# Patient Record
Sex: FEMALE | Race: WHITE | NOT HISPANIC OR LATINO | Employment: UNEMPLOYED | URBAN - METROPOLITAN AREA
[De-identification: names, ages, dates, MRNs, and addresses within clinical notes are randomized per-mention and may not be internally consistent; named-entity substitution may affect disease eponyms.]

---
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
## 2025-01-07 ENCOUNTER — APPOINTMENT (OUTPATIENT)
Dept: PEDIATRICS | Facility: CLINIC | Age: 20
End: 2025-01-07
Payer: COMMERCIAL

## 2025-01-07 VITALS — HEART RATE: 84 BPM | TEMPERATURE: 97.9 F | WEIGHT: 129.5 LBS | BODY MASS INDEX: 22.23 KG/M2 | RESPIRATION RATE: 16 BRPM

## 2025-01-07 DIAGNOSIS — R19.7 DIARRHEA, UNSPECIFIED TYPE: ICD-10-CM

## 2025-01-07 DIAGNOSIS — K92.1 BLOOD IN STOOL: ICD-10-CM

## 2025-01-07 DIAGNOSIS — R10.9 STOMACH PAIN: ICD-10-CM

## 2025-01-07 DIAGNOSIS — R63.4 WEIGHT LOSS: Primary | ICD-10-CM

## 2025-01-07 PROCEDURE — 99214 OFFICE O/P EST MOD 30 MIN: CPT | Performed by: NURSE PRACTITIONER

## 2025-01-07 ASSESSMENT — ENCOUNTER SYMPTOMS
RESPIRATORY NEGATIVE: 1
APPETITE CHANGE: 1
DIARRHEA: 1
HEMATOLOGIC/LYMPHATIC NEGATIVE: 1
EYES NEGATIVE: 1
NAUSEA: 1
CONSTIPATION: 1
NEUROLOGICAL NEGATIVE: 1
VOMITING: 1
FEVER: 0
BLOOD IN STOOL: 1
CARDIOVASCULAR NEGATIVE: 1
ACTIVITY CHANGE: 1
ABDOMINAL PAIN: 1
PSYCHIATRIC NEGATIVE: 1

## 2025-01-07 NOTE — PROGRESS NOTES
Subjective   Patient ID: Sushila Jeong is a 19 y.o. female who presents for concerns about a tape worm.  Pt states at the beginning of 2024 she was 180# and now is 130  Trouble keeping food down  Vomiting  Bm changes consistency, sometimes soft, sometimes formed  Abdominal pain and burning  Saw what appeared to be a tape worm in her bm the other day, does have photo    Past year with intermittent stomach pain, vomiting, diarrhea. Sometimes blood in stool. Sometimes with fever. Not eating well, because cannot keep down. No urinary sx. Voids 2 times/day. Stomach feels like burning,not like when she has abscess. 5 days ago saw worm like thing in stool. Followed by GI, has colonoscopy end of month. Has lost at least 50lbs this past year.        Review of Systems   Constitutional:  Positive for activity change and appetite change. Negative for fever.   HENT: Negative.     Eyes: Negative.    Respiratory: Negative.     Cardiovascular: Negative.    Gastrointestinal:  Positive for abdominal pain, blood in stool, constipation, diarrhea, nausea and vomiting.   Neurological: Negative.    Hematological: Negative.    Psychiatric/Behavioral: Negative.         Objective   Physical Exam  Constitutional:       General: She is not in acute distress.     Appearance: She is normal weight.   HENT:      Nose: Nose normal.      Mouth/Throat:      Mouth: Mucous membranes are moist.      Pharynx: Oropharynx is clear.   Eyes:      Conjunctiva/sclera: Conjunctivae normal.   Cardiovascular:      Rate and Rhythm: Normal rate and regular rhythm.      Heart sounds: Normal heart sounds.   Pulmonary:      Effort: Pulmonary effort is normal.      Breath sounds: Normal breath sounds.   Abdominal:      General: Abdomen is flat. Bowel sounds are normal. There is no distension.      Palpations: Abdomen is soft. There is no mass.      Tenderness: There is abdominal tenderness. There is no right CVA tenderness, left CVA tenderness, guarding or  rebound.      Hernia: No hernia is present.   Musculoskeletal:      Cervical back: Neck supple.   Lymphadenopathy:      Cervical: No cervical adenopathy.   Skin:     Coloration: Skin is pale.   Neurological:      Mental Status: She is alert and oriented to person, place, and time.   Psychiatric:         Mood and Affect: Mood normal.         Behavior: Behavior normal.         Assessment/Plan     To get stool culture, O&P, reducing substances. Will check in with GI. Plan to follow up with them this month for colonoscopy. Follow up sooner with fever, poor fluid intake, dehydration, any worsening pain. Has zofran as needed for nausea/vomiting.       Amanda Deluca MA 01/07/25 4:03 PM

## 2025-01-08 ENCOUNTER — APPOINTMENT (OUTPATIENT)
Dept: GASTROENTEROLOGY | Facility: EXTERNAL LOCATION | Age: 20
End: 2025-01-08
Payer: COMMERCIAL

## 2025-01-08 PROCEDURE — 99285 EMERGENCY DEPT VISIT HI MDM: CPT | Performed by: EMERGENCY MEDICINE

## 2025-01-08 ASSESSMENT — COLUMBIA-SUICIDE SEVERITY RATING SCALE - C-SSRS
6. HAVE YOU EVER DONE ANYTHING, STARTED TO DO ANYTHING, OR PREPARED TO DO ANYTHING TO END YOUR LIFE?: NO
2. HAVE YOU ACTUALLY HAD ANY THOUGHTS OF KILLING YOURSELF?: NO
1. IN THE PAST MONTH, HAVE YOU WISHED YOU WERE DEAD OR WISHED YOU COULD GO TO SLEEP AND NOT WAKE UP?: NO

## 2025-01-08 ASSESSMENT — PAIN - FUNCTIONAL ASSESSMENT: PAIN_FUNCTIONAL_ASSESSMENT: 0-10

## 2025-01-08 ASSESSMENT — PAIN DESCRIPTION - LOCATION: LOCATION: ABDOMEN

## 2025-01-08 ASSESSMENT — PAIN SCALES - GENERAL: PAINLEVEL_OUTOF10: 7

## 2025-01-08 ASSESSMENT — PAIN DESCRIPTION - ORIENTATION: ORIENTATION: MID

## 2025-01-08 ASSESSMENT — PAIN DESCRIPTION - DESCRIPTORS: DESCRIPTORS: BURNING

## 2025-01-09 ENCOUNTER — APPOINTMENT (OUTPATIENT)
Dept: RADIOLOGY | Facility: HOSPITAL | Age: 20
End: 2025-01-09

## 2025-01-09 ENCOUNTER — HOSPITAL ENCOUNTER (EMERGENCY)
Facility: HOSPITAL | Age: 20
Discharge: HOME | End: 2025-01-09
Attending: EMERGENCY MEDICINE

## 2025-01-09 VITALS
HEART RATE: 79 BPM | RESPIRATION RATE: 16 BRPM | WEIGHT: 128 LBS | BODY MASS INDEX: 21.85 KG/M2 | HEIGHT: 64 IN | SYSTOLIC BLOOD PRESSURE: 115 MMHG | OXYGEN SATURATION: 97 % | TEMPERATURE: 98.4 F | DIASTOLIC BLOOD PRESSURE: 78 MMHG

## 2025-01-09 DIAGNOSIS — K59.00 CONSTIPATION, UNSPECIFIED CONSTIPATION TYPE: Primary | ICD-10-CM

## 2025-01-09 LAB
ALBUMIN SERPL BCP-MCNC: 4.2 G/DL (ref 3.4–5)
ALP SERPL-CCNC: 57 U/L (ref 33–110)
ALT SERPL W P-5'-P-CCNC: 5 U/L (ref 7–45)
ANION GAP SERPL CALC-SCNC: 12 MMOL/L (ref 10–20)
APPEARANCE UR: ABNORMAL
AST SERPL W P-5'-P-CCNC: 8 U/L (ref 9–39)
B-HCG SERPL-ACNC: <2 MIU/ML
BACTERIA #/AREA URNS AUTO: ABNORMAL /HPF
BASOPHILS # BLD AUTO: 0.03 X10*3/UL (ref 0–0.1)
BASOPHILS NFR BLD AUTO: 0.4 %
BILIRUB SERPL-MCNC: 1.3 MG/DL (ref 0–1.2)
BILIRUB UR STRIP.AUTO-MCNC: NEGATIVE MG/DL
BUN SERPL-MCNC: 10 MG/DL (ref 6–23)
CALCIUM SERPL-MCNC: 9.1 MG/DL (ref 8.6–10.3)
CHLORIDE SERPL-SCNC: 107 MMOL/L (ref 98–107)
CO2 SERPL-SCNC: 25 MMOL/L (ref 21–32)
COLOR UR: YELLOW
CREAT SERPL-MCNC: 0.52 MG/DL (ref 0.5–1.05)
EGFRCR SERPLBLD CKD-EPI 2021: >90 ML/MIN/1.73M*2
EOSINOPHIL # BLD AUTO: 0.14 X10*3/UL (ref 0–0.7)
EOSINOPHIL NFR BLD AUTO: 1.8 %
ERYTHROCYTE [DISTWIDTH] IN BLOOD BY AUTOMATED COUNT: 14.6 % (ref 11.5–14.5)
FLUAV RNA RESP QL NAA+PROBE: NOT DETECTED
FLUBV RNA RESP QL NAA+PROBE: NOT DETECTED
GLUCOSE SERPL-MCNC: 86 MG/DL (ref 74–99)
GLUCOSE UR STRIP.AUTO-MCNC: NORMAL MG/DL
HCT VFR BLD AUTO: 37.2 % (ref 36–46)
HGB BLD-MCNC: 12.1 G/DL (ref 12–16)
IMM GRANULOCYTES # BLD AUTO: 0.02 X10*3/UL (ref 0–0.7)
IMM GRANULOCYTES NFR BLD AUTO: 0.3 % (ref 0–0.9)
KETONES UR STRIP.AUTO-MCNC: NEGATIVE MG/DL
LEUKOCYTE ESTERASE UR QL STRIP.AUTO: ABNORMAL
LIPASE SERPL-CCNC: 25 U/L (ref 9–82)
LYMPHOCYTES # BLD AUTO: 2.15 X10*3/UL (ref 1.2–4.8)
LYMPHOCYTES NFR BLD AUTO: 27.1 %
MCH RBC QN AUTO: 25.6 PG (ref 26–34)
MCHC RBC AUTO-ENTMCNC: 32.5 G/DL (ref 32–36)
MCV RBC AUTO: 79 FL (ref 80–100)
MONOCYTES # BLD AUTO: 0.55 X10*3/UL (ref 0.1–1)
MONOCYTES NFR BLD AUTO: 6.9 %
MUCOUS THREADS #/AREA URNS AUTO: ABNORMAL /LPF
NEUTROPHILS # BLD AUTO: 5.03 X10*3/UL (ref 1.2–7.7)
NEUTROPHILS NFR BLD AUTO: 63.5 %
NITRITE UR QL STRIP.AUTO: NEGATIVE
NRBC BLD-RTO: 0 /100 WBCS (ref 0–0)
PH UR STRIP.AUTO: 7.5 [PH]
PLATELET # BLD AUTO: 266 X10*3/UL (ref 150–450)
POTASSIUM SERPL-SCNC: 4.2 MMOL/L (ref 3.5–5.3)
PROT SERPL-MCNC: 7.1 G/DL (ref 6.4–8.2)
PROT UR STRIP.AUTO-MCNC: ABNORMAL MG/DL
RBC # BLD AUTO: 4.73 X10*6/UL (ref 4–5.2)
RBC # UR STRIP.AUTO: NEGATIVE /UL
RBC #/AREA URNS AUTO: ABNORMAL /HPF
RSV RNA RESP QL NAA+PROBE: NOT DETECTED
S PYO DNA THROAT QL NAA+PROBE: NOT DETECTED
SARS-COV-2 RNA RESP QL NAA+PROBE: NOT DETECTED
SODIUM SERPL-SCNC: 140 MMOL/L (ref 136–145)
SP GR UR STRIP.AUTO: 1.03
SQUAMOUS #/AREA URNS AUTO: ABNORMAL /HPF
UROBILINOGEN UR STRIP.AUTO-MCNC: ABNORMAL MG/DL
WBC # BLD AUTO: 7.9 X10*3/UL (ref 4.4–11.3)
WBC #/AREA URNS AUTO: ABNORMAL /HPF

## 2025-01-09 PROCEDURE — 74018 RADEX ABDOMEN 1 VIEW: CPT

## 2025-01-09 PROCEDURE — 87086 URINE CULTURE/COLONY COUNT: CPT | Mod: AHULAB | Performed by: EMERGENCY MEDICINE

## 2025-01-09 PROCEDURE — 83690 ASSAY OF LIPASE: CPT | Performed by: EMERGENCY MEDICINE

## 2025-01-09 PROCEDURE — 76856 US EXAM PELVIC COMPLETE: CPT | Performed by: RADIOLOGY

## 2025-01-09 PROCEDURE — 81001 URINALYSIS AUTO W/SCOPE: CPT | Performed by: EMERGENCY MEDICINE

## 2025-01-09 PROCEDURE — 87637 SARSCOV2&INF A&B&RSV AMP PRB: CPT | Performed by: EMERGENCY MEDICINE

## 2025-01-09 PROCEDURE — 2500000004 HC RX 250 GENERAL PHARMACY W/ HCPCS (ALT 636 FOR OP/ED): Performed by: EMERGENCY MEDICINE

## 2025-01-09 PROCEDURE — 84702 CHORIONIC GONADOTROPIN TEST: CPT | Performed by: EMERGENCY MEDICINE

## 2025-01-09 PROCEDURE — 87651 STREP A DNA AMP PROBE: CPT | Performed by: EMERGENCY MEDICINE

## 2025-01-09 PROCEDURE — 36415 COLL VENOUS BLD VENIPUNCTURE: CPT | Performed by: EMERGENCY MEDICINE

## 2025-01-09 PROCEDURE — 80053 COMPREHEN METABOLIC PANEL: CPT | Performed by: EMERGENCY MEDICINE

## 2025-01-09 PROCEDURE — 74018 RADEX ABDOMEN 1 VIEW: CPT | Performed by: SURGERY

## 2025-01-09 PROCEDURE — 76856 US EXAM PELVIC COMPLETE: CPT

## 2025-01-09 PROCEDURE — 85025 COMPLETE CBC W/AUTO DIFF WBC: CPT | Performed by: EMERGENCY MEDICINE

## 2025-01-09 RX ORDER — POLYETHYLENE GLYCOL 3350 17 G/17G
17 POWDER, FOR SOLUTION ORAL DAILY
Qty: 3 PACKET | Refills: 0 | Status: SHIPPED | OUTPATIENT
Start: 2025-01-09 | End: 2025-01-12

## 2025-01-09 RX ORDER — ONDANSETRON 4 MG/1
4 TABLET, ORALLY DISINTEGRATING ORAL EVERY 8 HOURS PRN
Qty: 15 TABLET | Refills: 0 | Status: SHIPPED | OUTPATIENT
Start: 2025-01-09

## 2025-01-09 RX ORDER — POLYETHYLENE GLYCOL 3350 17 G/17G
17 POWDER, FOR SOLUTION ORAL ONCE
Status: COMPLETED | OUTPATIENT
Start: 2025-01-09 | End: 2025-01-09

## 2025-01-09 RX ADMIN — POLYETHYLENE GLYCOL 3350 17 G: 17 POWDER, FOR SOLUTION ORAL at 06:44

## 2025-01-09 ASSESSMENT — PAIN - FUNCTIONAL ASSESSMENT: PAIN_FUNCTIONAL_ASSESSMENT: 0-10

## 2025-01-09 ASSESSMENT — PAIN SCALES - GENERAL: PAINLEVEL_OUTOF10: 0 - NO PAIN

## 2025-01-09 NOTE — PROGRESS NOTES
Emergency Medicine Transition of Care Note.    I received Sushila Jeong in signout from Dr. Pleitez.  Please see the previous ED provider note for all HPI, PE and MDM up to the time of signout at 6 AM. This is in addition to the primary record.    In brief Sushila Jeong is an 19 y.o. female presenting for   Chief Complaint   Patient presents with    Abdominal Pain    Nausea    Vomiting    possible parasite     At the time of signout we were awaiting: Pelvic ultrasound results         Medical Decision Making  Pelvic ultrasound is fairly unremarkable has an physiologic fluid in the cul-de-sac otherwise negative for pelvic ultrasound no evidence of TOA identified.  On my exam patient is comfortable afebrile normotensive soft abdomen on deep palpation nontender no rebound or guarding good bowel sounds.  X-ray shows large stool burden labs reviewed urine has some leuk esterase and some bacteria but she has no symptoms UTI so I think she has no urine tract infection clinically patient has a history of chronic vomiting for the last many months and chronic constipation she be discharged home with outpatient follow-up with gastroenterology MiraLAX and Zofran with strict return precaution.    Final diagnoses:   None           Procedure  Procedures    Audie Glynn MD

## 2025-01-09 NOTE — ED TRIAGE NOTES
Pt from home c/o stomach pain and possible parasite. Pt stated that she noticed a tape worm on her stool. Seen by PCP yesterday and was advised to go to ED. Pt has n/v and poor appetite.

## 2025-01-09 NOTE — ED PROVIDER NOTES
HPI   Chief Complaint   Patient presents with    Abdominal Pain    Nausea    Vomiting    possible parasite       The patient presents with acute on chronic abdominal pain and vomiting.  Patient does have issues with vomiting ongoing for 8 months.  She does have a GI doctor.  She is scheduling a colonoscopy.  She is worried about possible tapeworms were parasitic infections.  She has a photo of a formed stool with a light streak in it.  The patient does work in animal rescue but denies any hunting, camping, hiking, international travel or recent antibiotic use.              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    Personal history of other infectious and parasitic diseases 2016    History of viral gastroenteritis    Personal history of other specified conditions 2014    History of epistaxis    Personal history of urinary (tract) infections 2016    History of urinary tract infection    Personal history of urinary (tract) infections 2016    History of urinary tract infection     , unspecified weeks of gestation ([facility]) 2016    Premature birth     Past Surgical History:   Procedure Laterality Date    APPENDECTOMY  2016    Appendectomy     Family History   Problem Relation Name Age of Onset    Brain cancer Mother      Other (Hyperemesis gravidarum, antepartum) Mother          In 1st and 3rd preg with latter requiring 3 mo hospitalization and TPN    Testicular cancer Sibling      Other (Hyperemesis gravidarum, antepartum) Maternal Great-Grandmother          Cause of death 191      Social History     Tobacco Use    Smoking status: Never    Smokeless tobacco: Never   Vaping Use    Vaping status: Never Used   Substance Use Topics    Alcohol use: Never    Drug use: Never       Physical Exam   ED Triage Vitals   Temperature Heart Rate Respirations BP   01/08/25 2230 01/08/25 2230 01/08/25 2230 01/08/25 2236   36.9 °C (98.4 °F) 88 17 101/71      Pulse Ox Temp Source Heart Rate Source Patient Position   01/08/25 2230 01/08/25 2230 01/08/25 2230 01/08/25 2230   97 % Temporal Monitor Sitting      BP Location FiO2 (%)     01/08/25 2230 --     Right arm        Physical Exam  Vitals and nursing note reviewed.   Constitutional:       General: She is not in acute distress.     Appearance: She is well-developed.   HENT:      Head: Normocephalic and atraumatic.   Eyes:      Conjunctiva/sclera: Conjunctivae normal.   Cardiovascular:      Rate and Rhythm: Normal rate and regular rhythm.      Heart sounds: No murmur heard.  Pulmonary:      Effort: Pulmonary effort is normal. No respiratory distress.      Breath sounds: Normal breath sounds.   Abdominal:      Palpations: Abdomen is soft.      Tenderness: There is no abdominal tenderness.   Musculoskeletal:         General: No swelling.      Cervical back: Neck supple.   Skin:     General: Skin is warm and dry.      Capillary Refill: Capillary refill takes less than 2 seconds.   Neurological:      Mental Status: She is alert.   Psychiatric:         Mood and Affect: Mood normal.           ED Course & MDM   Diagnoses as of 01/11/25 0547   Constipation, unspecified constipation type                 No data recorded     [place] Coma Scale Score: 15 (01/08/25 2235 : Rubin Acharya RN)                           Medical Decision Making  Patient does not appear to have dry mucous membranes.  Patient does not have any significant tenderness or guarding or rebound.  I would like to hold off on CT imaging at this time unless there is any significant lab  abnormalities.  Patient has had no bowel movements for 1 week.  This could be constipation.  Family is requesting enema.  I did order on arrival but it was not given.    Procedure  Procedures     Patrick Pleitez MD  01/11/25 1665

## 2025-01-10 LAB — BACTERIA UR CULT: NORMAL

## 2025-01-11 ENCOUNTER — LAB (OUTPATIENT)
Dept: LAB | Facility: LAB | Age: 20
End: 2025-01-11

## 2025-01-11 DIAGNOSIS — R63.4 WEIGHT LOSS: ICD-10-CM

## 2025-01-11 DIAGNOSIS — R10.9 STOMACH PAIN: ICD-10-CM

## 2025-01-11 PROCEDURE — 84376 SUGARS SINGLE QUAL: CPT

## 2025-01-11 PROCEDURE — 87506 IADNA-DNA/RNA PROBE TQ 6-11: CPT

## 2025-01-11 PROCEDURE — 87328 CRYPTOSPORIDIUM AG IA: CPT

## 2025-01-11 PROCEDURE — 87329 GIARDIA AG IA: CPT

## 2025-01-12 LAB

## 2025-01-14 LAB — REDUCING SUBS STL QL: NORMAL

## 2025-01-15 LAB
CRYPTOSP AG STL QL IA: NEGATIVE
G LAMBLIA AG STL QL IA: NEGATIVE

## 2025-01-16 ENCOUNTER — TELEPHONE (OUTPATIENT)
Dept: PEDIATRICS | Facility: CLINIC | Age: 20
End: 2025-01-16

## 2025-01-16 LAB — O+P STL MICRO: NEGATIVE

## 2025-01-17 NOTE — TELEPHONE ENCOUNTER
1/4-1/19, please write note excusing her from work these dates due to ongoing stomach problems. Upload to portal. Thanks, TR

## 2025-01-31 ENCOUNTER — APPOINTMENT (OUTPATIENT)
Dept: GASTROENTEROLOGY | Facility: EXTERNAL LOCATION | Age: 20
End: 2025-01-31
Payer: COMMERCIAL

## 2025-01-31 DIAGNOSIS — D50.0 IRON DEFICIENCY ANEMIA DUE TO CHRONIC BLOOD LOSS: ICD-10-CM

## 2025-01-31 DIAGNOSIS — R10.13 EPIGASTRIC PAIN: ICD-10-CM

## 2025-01-31 DIAGNOSIS — K92.1 MELENA: ICD-10-CM

## 2025-01-31 DIAGNOSIS — R11.14 BILIOUS VOMITING WITH NAUSEA: Primary | ICD-10-CM

## 2025-01-31 DIAGNOSIS — R19.4 CHANGE IN BOWEL HABITS: ICD-10-CM

## 2025-01-31 DIAGNOSIS — R11.2 NAUSEA AND VOMITING, UNSPECIFIED VOMITING TYPE: ICD-10-CM

## 2025-01-31 PROCEDURE — 88305 TISSUE EXAM BY PATHOLOGIST: CPT

## 2025-01-31 PROCEDURE — 43239 EGD BIOPSY SINGLE/MULTIPLE: CPT | Performed by: INTERNAL MEDICINE

## 2025-01-31 PROCEDURE — 45380 COLONOSCOPY AND BIOPSY: CPT | Performed by: INTERNAL MEDICINE

## 2025-01-31 PROCEDURE — 88305 TISSUE EXAM BY PATHOLOGIST: CPT | Performed by: PATHOLOGY

## 2025-01-31 NOTE — PROGRESS NOTES
Procedure note is completed in [employer]. Report is scanned under 'Media' tab in Epic.    Vomiting worse with cyproheptadine. Stopped it. Pain problem is nausea.     Silvia, can you schedule her a TV or OV in 1 month?

## 2025-02-03 ENCOUNTER — LAB REQUISITION (OUTPATIENT)
Dept: LAB | Facility: HOSPITAL | Age: 20
End: 2025-02-03
Payer: COMMERCIAL

## 2025-02-03 DIAGNOSIS — R19.4 CHANGE IN BOWEL HABIT: ICD-10-CM

## 2025-02-07 ENCOUNTER — OFFICE VISIT (OUTPATIENT)
Facility: CLINIC | Age: 20
End: 2025-02-07
Payer: COMMERCIAL

## 2025-02-07 VITALS — HEIGHT: 64 IN | BODY MASS INDEX: 21.85 KG/M2 | WEIGHT: 128 LBS

## 2025-02-07 DIAGNOSIS — D50.0 IRON DEFICIENCY ANEMIA DUE TO CHRONIC BLOOD LOSS: ICD-10-CM

## 2025-02-07 DIAGNOSIS — K58.2 IRRITABLE BOWEL SYNDROME WITH MIXED BOWEL HABITS: ICD-10-CM

## 2025-02-07 DIAGNOSIS — R11.2 NAUSEA AND VOMITING, UNSPECIFIED VOMITING TYPE: Primary | ICD-10-CM

## 2025-02-07 DIAGNOSIS — R19.4 CHANGE IN BOWEL HABITS: ICD-10-CM

## 2025-02-07 LAB
LABORATORY COMMENT REPORT: NORMAL
PATH REPORT.COMMENTS IMP SPEC: NORMAL
PATH REPORT.FINAL DX SPEC: NORMAL
PATH REPORT.GROSS SPEC: NORMAL
PATH REPORT.RELEVANT HX SPEC: NORMAL
PATH REPORT.TOTAL CANCER: NORMAL

## 2025-02-07 PROCEDURE — 99214 OFFICE O/P EST MOD 30 MIN: CPT | Performed by: INTERNAL MEDICINE

## 2025-02-07 PROCEDURE — 3008F BODY MASS INDEX DOCD: CPT | Performed by: INTERNAL MEDICINE

## 2025-02-07 RX ORDER — NORTRIPTYLINE HYDROCHLORIDE 10 MG/1
10 CAPSULE ORAL NIGHTLY
Qty: 10 CAPSULE | Refills: 0 | Status: SHIPPED | OUTPATIENT
Start: 2025-02-07 | End: 2025-02-17

## 2025-02-07 RX ORDER — FERROUS SULFATE 325(65) MG
325 TABLET, DELAYED RELEASE (ENTERIC COATED) ORAL DAILY
Qty: 30 TABLET | Refills: 11 | Status: SHIPPED | OUTPATIENT
Start: 2025-02-07 | End: 2026-02-07

## 2025-02-07 RX ORDER — NORTRIPTYLINE HYDROCHLORIDE 25 MG/1
25 CAPSULE ORAL NIGHTLY
Qty: 90 CAPSULE | Refills: 1 | Status: SHIPPED | OUTPATIENT
Start: 2025-02-07 | End: 2026-02-07

## 2025-02-07 NOTE — PROGRESS NOTES
Primary Care Provider: POLO Nayak-CNP  Referring Provider: No ref. provider found  My final recommendations will be communicated back to the referring physician and/or primary care provider via shared medical records or via US mail.    Informed consent to proceed with audiovisual telehealth visit was obtained from the patient. The patient is aware of the risks and benefits of participating in a telehealth encounter. Further, the patient understands that services will be billed to insurance for this telehealth encounter and they also understand they would be responsible for any copayments/ deductibles per their plan. The patient confirmed this. The encounter took place with patient at his/her home and the physician at the physician's office. The encounter was performed using [employer].    Chief Complaint (Reason for visit):   Sushila Jeong is a 19 y.o. female who presents for nausea vomitting and change in bowel habits x few months    ASSESSMENT AND PLAN     Assessment & Plan  Bilious vomiting with nausea  10/28/2024  D/d include [facility], however pt has red flag of melena    - basic labs - all normal  - will try cyproheptadine and assess response    2/7/2025  Labs normal  US - GB sludge, otherwise normal  EGD - unrevealing, path normal    Did not tolerate Periactin - had worsening nausea and vomitting, stopped it.    -I talked to the patient about risks benefits and alternatives of starting tricyclic's  -I will start her on low-dose nortriptyline    Change in bowel habits  Likely 2/2 IBS, but with melena and JULIANNE, will r/o IBD  - EGD/Cscope    US - GB sludge, otherwise normal  EGD - unrevealing, path normal  Colonoscopy-normal, path showed patchy acute colitis    2/7/2025  - I did discuss the pathology with patient over phone  -Patient has been having diarrhea since her colonoscopy  -Will start her on tricyclic's    Iron deficiency anemia due to chronic blood loss  ?menstrual bleeding but pt denies  heavy bleeding  EGD and colonoscopy has been unrevealing    -I will start the patient on p.o. iron  -If she has inadequate response, I will consider a capsule endoscopy      Fern Pardo MD, MS      SUBJECTIVE   HPI: History obtained from patient    10/28/2024  19-year-old female who comes to see me for multiple GI complaints    A) Her main complaint is episodic nausea and vomiting  She has 1 good week followed by 1 bad week  During her bad week she has about 3-4 emesis episodes per day.  After eating she has emesis about 50% of the time  During good week, she feels nauseous, but no emesis    Also had constipation with diarrhea during her bad week. More regular BMs during her good week    RF  Melena  Low Hb of 8  Has lost 45 lbs in last few months    REDFLAGS  Pt denies family history of GI cancer    2025  EGD - unrevealing, path normal  Colonoscopy-normal, path showed patchy acute colitis    2025  Did not tolerate Periactin - had worsening nausea and vomitting, stopped it.    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    Personal history of other infectious and parasitic diseases 2016    History of viral gastroenteritis    Personal history of other specified conditions 2014    History of epistaxis    Personal history of urinary (tract) infections 2016    History of urinary tract infection    Personal history of urinary (tract) infections 2016    History of urinary tract infection     , unspecified weeks of gestation ([facility]) 2016    Premature birth       Past Surgical History:   Procedure  "Laterality Date    APPENDECTOMY  02/08/2016    Appendectomy       Current Outpatient Medications   Medication Sig Dispense Refill    ondansetron ODT (Zofran-ODT) 4 mg disintegrating tablet Take 1 tablet (4 mg) by mouth every 8 hours if needed for nausea or vomiting. 20 tablet 1     No current facility-administered medications for this visit.       Allergies   Allergen Reactions    Penicillins Rash     OBJECTIVE   PHYSICAL EXAM:  Pulse 87   Ht 1.626 m (5' 4\")   Wt 60.3 kg (133 lb)   BMI 22.83 kg/m²      LABS/IMAGING/SCOPES  Lab Results   Component Value Date    WBC 9.5 03/16/2024    HGB 8.5 (L) 03/16/2024    HCT 27.1 (L) 03/16/2024    MCV 80 03/16/2024     03/16/2024     Lab Results   Component Value Date    GLUCOSE 75 03/16/2024    CALCIUM 7.7 (L) 03/16/2024     03/16/2024    K 3.8 03/16/2024    CO2 27 03/16/2024     03/16/2024    BUN 6 03/16/2024    CREATININE 0.43 (L) 03/16/2024     Lab Results   Component Value Date    ALT 6 (L) 03/11/2024    AST 5 (L) 03/11/2024    ALKPHOS 78 03/11/2024    BILITOT 0.4 03/11/2024       === 03/11/24 ===    CT ABDOMEN PELVIS W IV CONTRAST    Addendum 3/12/2024  1:45 AM ------------------------------------------------  Interpreted By:  Cody Manuel,  ADDENDUM:  Additionally, recommend interventional radiology consultation for  image guided drainage of the pelvic abscess.    Signed by: Cody Manuel 3/12/2024 1:45 AM    -------- ORIGINAL REPORT --------  Dictation workstation:   [id number]    - Impression -  1. Large pelvic abscess posterior to the right adnexa and  posterolateral and superior to the uterus measuring 7 cm x 5.2 cm x 6  cm appearing to contiguously extend from the right fallopian tube  which is diffusely dilated and diffusely inflamed with hyperenhancing  wall thickening. The right ovary is normal in size and is seen  separate from the aforementioned structures, mildly compressed. Given  that the appendix has been surgically removed in " 2016, the findings  are most suggestive of acute pelvic inflammatory disease with  right-sided salpingitis. The abscess abuts and mildly compresses the  rectosigmoid junction with mild secondary wall thickening of the  colon. This is unusual for patient has not been sexually active and  therefore should be further investigated for underlying etiology with  gynecology consultation and close clinical follow-up.    2. Trace bilateral pleural effusions.    3. Hepatomegaly.      MACRO:  Cody Manuel discussed the significance and urgency of this  critical finding by GREG CAVANAUGH with  NERI MCCAIN on 3/12/2024 at  1:37 am.  (**-RCF-**) Findings:  See findings.    Signed by: Cody Manuel 3/12/2024 1:37 AM  Dictation workstation:   [id number]    Fern Pardo MD, MS

## 2025-02-07 NOTE — LETTER
February 7, 2025     TON Nayak  [address] [place] [address] [place] [id number]    Patient: Sushila Jeong   YOB: 2005   Date of Visit: 2/7/2025       Dear TON Bellamy:    Thank you for referring Sushila Jeong to me for evaluation. Below are my notes for this consultation.  If you have questions, please do not hesitate to call me. I look forward to following your patient along with you.       Sincerely,     Fern Pardo MD, MS      CC: No Recipients  ______________________________________________________________________________________    Primary Care Provider: TON Nayak  Referring Provider: No ref. provider found  My final recommendations will be communicated back to the referring physician and/or primary care provider via shared medical records or via US mail.    Informed consent to proceed with audiovisual telehealth visit was obtained from the patient. The patient is aware of the risks and benefits of participating in a telehealth encounter. Further, the patient understands that services will be billed to insurance for this telehealth encounter and they also understand they would be responsible for any copayments/ deductibles per their plan. The patient confirmed this. The encounter took place with patient at his/her home and the physician at the physician's office. The encounter was performed using [employer].    Chief Complaint (Reason for visit):   Sushila Jeong is a 19 y.o. female who presents for nausea vomitting and change in bowel habits x few months    ASSESSMENT AND PLAN     Assessment & Plan  Bilious vomiting with nausea  10/28/2024  [facility]/d include [facility], however pt has red flag of melena    - basic labs - all normal  - will try cyproheptadine and assess response    2/7/2025  Labs normal  US - GB sludge, otherwise normal  EGD - unrevealing, path normal    Did not tolerate Periactin - had worsening nausea and vomitting,  stopped it.    -I talked to the patient about risks benefits and alternatives of starting tricyclic's  -I will start her on low-dose nortriptyline    Change in bowel habits  Likely 2/2 IBS, but with melena and JULIANNE, will r/o IBD  - EGD/Cscope    US - GB sludge, otherwise normal  EGD - unrevealing, path normal  Colonoscopy-normal, path showed patchy acute colitis    2/7/2025  - I did discuss the pathology with patient over phone  -Patient has been having diarrhea since her colonoscopy  -Will start her on tricyclic's    Iron deficiency anemia due to chronic blood loss  ?menstrual bleeding but pt denies heavy bleeding  EGD and colonoscopy has been unrevealing    -I will start the patient on p.o. iron  -If she has inadequate response, I will consider a capsule endoscopy      Fern Pardo MD, MS      SUBJECTIVE   HPI: History obtained from patient    10/28/2024  19-year-old female who comes to see me for multiple GI complaints    A) Her main complaint is episodic nausea and vomiting  She has 1 good week followed by 1 bad week  During her bad week she has about 3-4 emesis episodes per day.  After eating she has emesis about 50% of the time  During good week, she feels nauseous, but no emesis    Also had constipation with diarrhea during her bad week. More regular BMs during her good week    RF  Melena  Low Hb of 8  Has lost 45 lbs in last few months    REDFLAGS  Pt denies family history of GI cancer    1/2025  EGD - unrevealing, path normal  Colonoscopy-normal, path showed patchy acute colitis    2/2025  Did not tolerate Periactin - had worsening nausea and vomitting, stopped it.    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   • Personal history of other infectious and parasitic diseases 2016    History of viral gastroenteritis   • Personal history of other specified conditions 2014    History of epistaxis   • Personal history of urinary (tract) infections 2016    History of urinary tract infection   • Personal history of urinary (tract) infections 2016    History of urinary tract infection   •  , unspecified weeks of gestation ([facility]) 2016    Premature birth       Past Surgical History:   Procedure Laterality Date   • APPENDECTOMY  2016    Appendectomy       Current Outpatient Medications   Medication Sig Dispense Refill   • ondansetron ODT (Zofran-ODT) 4 mg disintegrating tablet Take 1 tablet (4 mg) by mouth every 8 hours if needed for nausea or vomiting. 20 tablet 1     No current facility-administered medications for this visit.       Allergies   Allergen Reactions   • Penicillins Rash     OBJECTIVE   PHYSICAL EXAM:  Pulse 87   Ht 1.626 m (5' 4\")   Wt 60.3 kg (133 lb)   BMI 22.83 kg/m²      LABS/IMAGING/SCOPES  Lab Results   Component Value Date    WBC 9.5 2024    HGB 8.5 (L) 2024    HCT 27.1 (L) 2024    MCV 80 2024     2024     Lab Results   Component Value Date    GLUCOSE 75 2024    CALCIUM 7.7 (L) 2024     2024    K 3.8 2024    CO2 27 2024     2024    BUN 6 2024    CREATININE 0.43 (L) 2024     Lab Results   Component Value Date    ALT 6 (L) 2024    AST 5 (L) 2024    ALKPHOS 78 2024    BILITOT 0.4 2024       === 24 ===    CT ABDOMEN PELVIS W IV CONTRAST    Addendum 3/12/2024  1:45 AM ------------------------------------------------  Interpreted By:  Cody Manuel,  ADDENDUM:  Additionally, recommend interventional radiology consultation for  image guided " drainage of the pelvic abscess.    Signed by: Cody Manuel 3/12/2024 1:45 AM    -------- ORIGINAL REPORT --------  Dictation workstation:   [id number]    - Impression -  1. Large pelvic abscess posterior to the right adnexa and  posterolateral and superior to the uterus measuring 7 cm x 5.2 cm x 6  cm appearing to contiguously extend from the right fallopian tube  which is diffusely dilated and diffusely inflamed with hyperenhancing  wall thickening. The right ovary is normal in size and is seen  separate from the aforementioned structures, mildly compressed. Given  that the appendix has been surgically removed in 2016, the findings  are most suggestive of acute pelvic inflammatory disease with  right-sided salpingitis. The abscess abuts and mildly compresses the  rectosigmoid junction with mild secondary wall thickening of the  colon. This is unusual for patient has not been sexually active and  therefore should be further investigated for underlying etiology with  gynecology consultation and close clinical follow-up.    2. Trace bilateral pleural effusions.    3. Hepatomegaly.      MACRO:  Cody Manuel discussed the significance and urgency of this  critical finding by GREG CAVANAUGH with  NERI MCCAIN on 3/12/2024 at  1:37 am.  (**-RCF-**) Findings:  See findings.    Signed by: Cody Manuel 3/12/2024 1:37 AM  Dictation workstation:   [id number]    Fern Pardo MD, MS

## 2025-04-07 ENCOUNTER — APPOINTMENT (OUTPATIENT)
Facility: CLINIC | Age: 20
End: 2025-04-07
Payer: COMMERCIAL

## 2025-04-07 VITALS — WEIGHT: 125 LBS | BODY MASS INDEX: 21.34 KG/M2 | HEART RATE: 80 BPM | HEIGHT: 64 IN

## 2025-04-07 DIAGNOSIS — R11.2 NAUSEA AND VOMITING, UNSPECIFIED VOMITING TYPE: ICD-10-CM

## 2025-04-07 DIAGNOSIS — D50.0 IRON DEFICIENCY ANEMIA DUE TO CHRONIC BLOOD LOSS: ICD-10-CM

## 2025-04-07 DIAGNOSIS — R11.14 BILIOUS VOMITING WITH NAUSEA: Primary | ICD-10-CM

## 2025-04-07 PROCEDURE — 3008F BODY MASS INDEX DOCD: CPT | Performed by: INTERNAL MEDICINE

## 2025-04-07 PROCEDURE — 99215 OFFICE O/P EST HI 40 MIN: CPT | Performed by: INTERNAL MEDICINE

## 2025-04-07 RX ORDER — ONDANSETRON 4 MG/1
4 TABLET, ORALLY DISINTEGRATING ORAL EVERY 12 HOURS PRN
Qty: 30 TABLET | Refills: 1 | Status: SHIPPED | OUTPATIENT
Start: 2025-04-07

## 2025-04-07 RX ORDER — NORTRIPTYLINE HYDROCHLORIDE 50 MG/1
50 CAPSULE ORAL NIGHTLY
Qty: 30 CAPSULE | Refills: 11 | Status: SHIPPED | OUTPATIENT
Start: 2025-04-07 | End: 2026-04-07

## 2025-04-07 NOTE — PROGRESS NOTES
Primary Care Provider: POLO Nayak-CNP  Referring Provider: No ref. provider found  My final recommendations will be communicated back to the referring physician and/or primary care provider via shared medical records or via US mail.      Chief Complaint (Reason for visit):   Sushila Jeong is a 19 y.o. female who presents for nausea vomitting and change in bowel habits x few months    ASSESSMENT AND PLAN     Assessment & Plan  Bilious vomiting with nausea  10/28/2024  [facility], however pt has red flag of melena    - basic labs - all normal  - will try cyproheptadine and assess response    2/7/2025  Labs normal  US - GB sludge, otherwise normal  EGD - unrevealing, path normal    Did not tolerate Periactin - had worsening nausea and vomitting, stopped it.    -I talked to the patient about risks benefits and alternatives of starting tricyclic's  -I will start her on low-dose nortriptyline    4/7/2025  Pt has excellent response to nortryptyline  She has less brain fog, less fatigued, abdominal pain-all of this has more or less resolved    Vomiting has gone down to 3 times per week, from 4 times a day  Nausea has also gotten significantly better  She is able to eat more     She does not have any major side effects  She moves her bowels every day, has some dry mouth for which she  increase her p.o. water intake.  She denies any urinary retention    -I discussed the option of staying on the same dosage versus increasing nortriptyline to 50 mg/day  -I reeducated her on the side effects  -We mutually decided to try 50 mg/day and assess response      Change in bowel habits  Likely 2/2 IBS, but with melena and JULIANNE, will r/o IBD  - EGD/Cscope    US - GB sludge, otherwise normal  EGD - unrevealing, path normal  Colonoscopy-normal, path showed patchy acute colitis    2/7/2025  - I did discuss the pathology with patient over phone  -Patient has been having diarrhea since her colonoscopy  -Will start her on  tricyclic's    Iron deficiency anemia due to chronic blood loss  ?menstrual bleeding but pt denies heavy bleeding  EGD and colonoscopy has been unrevealing    -I will start the patient on p.o. iron  -If she has inadequate response, I will consider a capsule endoscopy    4/7/2025  - I will recheck iron stores      Fern Pardo MD, MS      SUBJECTIVE   HPI: History obtained from patient    10/28/2024  19-year-old female who comes to see me for multiple GI complaints    A) Her main complaint is episodic nausea and vomiting  She has 1 good week followed by 1 bad week  During her bad week she has about 3-4 emesis episodes per day.  After eating she has emesis about 50% of the time  During good week, she feels nauseous, but no emesis    Also had constipation with diarrhea during her bad week. More regular BMs during her good week    RF  Melena  Low Hb of 8  Has lost 45 lbs in last few months    REDFLAGS  Pt denies family history of GI cancer    1/2025  EGD - unrevealing, path normal  Colonoscopy-normal, path showed patchy acute colitis    2/2025  Did not tolerate Periactin - had worsening nausea and vomitting, stopped it.    4/7/2025  Pt has excellent response to nortryptyline  She has less brain fog, less fatigued, abdominal pain-all of this has more or less resolved    Vomiting has gone down to 3 times per week, from 4 times a day  Nausea has also gotten significantly better  She is able to eat more     She does not have any major side effects  She moves her bowels every day, has some dry mouth for which she  increase her p.o. water intake.  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    Personal history of other infectious and parasitic diseases 2016    History of viral gastroenteritis    Personal history of other specified conditions 2014    History of epistaxis    Personal history of urinary (tract) infections 2016    History of urinary tract infection    Personal history of urinary (tract) infections 2016    History of urinary tract infection     , unspecified weeks of gestation ([facility]) 2016    Premature birth       Past Surgical History:   Procedure Laterality Date    APPENDECTOMY  2016    Appendectomy       Current Outpatient Medications   Medication Sig Dispense Refill    ondansetron ODT (Zofran-ODT) 4 mg disintegrating tablet Take 1 tablet (4 mg) by mouth every 8 hours if needed for nausea or vomiting. 20 tablet 1     No current facility-administered medications for this visit.       Allergies   Allergen Reactions    Penicillins Rash     OBJECTIVE   PHYSICAL EXAM:  Pulse 87   Ht 1.626 m (5' 4\")   Wt 60.3 kg (133 lb)   BMI 22.83 kg/m²      LABS/IMAGING/SCOPES  Lab Results   Component Value Date    WBC 9.5 2024    HGB 8.5 (L) 2024    HCT 27.1 (L) 2024    MCV 80 2024     2024     Lab Results   Component Value Date    GLUCOSE 75 2024    CALCIUM 7.7 (L) 2024     2024    K 3.8 2024    CO2 27 2024     2024    BUN 6 2024    CREATININE 0.43 (L) 2024     Lab Results   Component Value Date    ALT 6 (L) 2024    AST 5 (L) 2024    ALKPHOS 78 2024    BILITOT 0.4 2024       === 24 ===    CT ABDOMEN PELVIS W IV CONTRAST    Addendum 3/12/2024  1:45 AM ------------------------------------------------  Interpreted By:  Cody Manuel,  ADDENDUM:  Additionally, recommend interventional radiology consultation for  image guided drainage of the pelvic " abscess.    Signed by: Cody Manuel 3/12/2024 1:45 AM    -------- ORIGINAL REPORT --------  Dictation workstation:   [id number]    - Impression -  1. Large pelvic abscess posterior to the right adnexa and  posterolateral and superior to the uterus measuring 7 cm x 5.2 cm x 6  cm appearing to contiguously extend from the right fallopian tube  which is diffusely dilated and diffusely inflamed with hyperenhancing  wall thickening. The right ovary is normal in size and is seen  separate from the aforementioned structures, mildly compressed. Given  that the appendix has been surgically removed in 2016, the findings  are most suggestive of acute pelvic inflammatory disease with  right-sided salpingitis. The abscess abuts and mildly compresses the  rectosigmoid junction with mild secondary wall thickening of the  colon. This is unusual for patient has not been sexually active and  therefore should be further investigated for underlying etiology with  gynecology consultation and close clinical follow-up.    2. Trace bilateral pleural effusions.    3. Hepatomegaly.      MACRO:  Cody Manuel discussed the significance and urgency of this  critical finding by GREG CAVANAUGH with  NERI MCCAIN on 3/12/2024 at  1:37 am.  (**-RCF-**) Findings:  See findings.    Signed by: Cody Manuel 3/12/2024 1:37 AM  Dictation workstation:   [id number]    Fern Pardo MD, MS

## 2025-06-10 ENCOUNTER — APPOINTMENT (OUTPATIENT)
Dept: RADIOLOGY | Facility: HOSPITAL | Age: 20
End: 2025-06-10
Payer: MEDICARE

## 2025-06-10 ENCOUNTER — HOSPITAL ENCOUNTER (EMERGENCY)
Facility: HOSPITAL | Age: 20
Discharge: HOME | End: 2025-06-10
Payer: MEDICARE

## 2025-06-10 VITALS
DIASTOLIC BLOOD PRESSURE: 72 MMHG | OXYGEN SATURATION: 99 % | RESPIRATION RATE: 18 BRPM | HEART RATE: 79 BPM | SYSTOLIC BLOOD PRESSURE: 125 MMHG | TEMPERATURE: 98.9 F | BODY MASS INDEX: 22.2 KG/M2 | HEIGHT: 64 IN | WEIGHT: 130 LBS
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
## 2025-06-10 DIAGNOSIS — S70.01XA CONTUSION OF RIGHT HIP, INITIAL ENCOUNTER: Primary | ICD-10-CM

## 2025-06-10 DIAGNOSIS — S00.03XA CONTUSION OF SCALP, INITIAL ENCOUNTER: ICD-10-CM

## 2025-06-10 PROCEDURE — 73502 X-RAY EXAM HIP UNI 2-3 VIEWS: CPT | Mod: RIGHT SIDE | Performed by: RADIOLOGY

## 2025-06-10 PROCEDURE — 2500000001 HC RX 250 WO HCPCS SELF ADMINISTERED DRUGS (ALT 637 FOR MEDICARE OP): Performed by: PHYSICIAN ASSISTANT

## 2025-06-10 PROCEDURE — 99283 EMERGENCY DEPT VISIT LOW MDM: CPT

## 2025-06-10 PROCEDURE — 73502 X-RAY EXAM HIP UNI 2-3 VIEWS: CPT | Mod: RT

## 2025-06-10 RX ORDER — METHOCARBAMOL 500 MG/1
500 TABLET, FILM COATED ORAL EVERY 12 HOURS PRN
Qty: 20 TABLET | Refills: 0 | Status: SHIPPED | OUTPATIENT
Start: 2025-06-10

## 2025-06-10 RX ORDER — NAPROXEN 500 MG/1
500 TABLET ORAL EVERY 12 HOURS
Qty: 20 TABLET | Refills: 0 | Status: SHIPPED | OUTPATIENT
Start: 2025-06-10

## 2025-06-10 RX ORDER — IBUPROFEN 600 MG/1
600 TABLET, FILM COATED ORAL ONCE
Status: COMPLETED | OUTPATIENT
Start: 2025-06-10 | End: 2025-06-10

## 2025-06-10 RX ADMIN — IBUPROFEN 600 MG: 600 TABLET ORAL at 11:52

## 2025-06-10 ASSESSMENT — PAIN DESCRIPTION - DESCRIPTORS: DESCRIPTORS: ACHING

## 2025-06-10 ASSESSMENT — PAIN - FUNCTIONAL ASSESSMENT: PAIN_FUNCTIONAL_ASSESSMENT: 0-10

## 2025-06-10 ASSESSMENT — PAIN DESCRIPTION - LOCATION: LOCATION: HEAD

## 2025-06-10 ASSESSMENT — PAIN SCALES - GENERAL: PAINLEVEL_OUTOF10: 3
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
## 2025-06-10 NOTE — Clinical Note
Sushila Jeong was seen and treated in our emergency department on 6/10/2025.  She may return to work on 06/12/2025.       If you have any questions or concerns, please don't hesitate to call.      Uriel Covarrubias PA-C

## 2025-08-11 ENCOUNTER — APPOINTMENT (OUTPATIENT)
Facility: CLINIC | Age: 20
End: 2025-08-11
Payer: COMMERCIAL

## (undated) DEVICE — ELECTRODE, OPTI2 LAPAROSCOPIC SPATULA, CURVED

## (undated) DEVICE — TOWEL PACK, STERILE, 4/PACK, BLUE

## (undated) DEVICE — Device

## (undated) DEVICE — IRRIGATION SET, CYSTOSCOPY, REGULATING CLAMP, STRAIGHT, 81 IN

## (undated) DEVICE — DRAPE, INSTRUMENT, W/POUCH, STERI DRAPE, 9 5/8 X 18 LONG

## (undated) DEVICE — COVER HANDLE LIGHT, STERIS, BLUE, STERILE

## (undated) DEVICE — ASSEMBLY, STRYKER FLOW 2, SUCTION IRRIGATOR, WITH TIP

## (undated) DEVICE — SYRINGE, 10 CC, LUER LOCK

## (undated) DEVICE — TROCAR, OPTICAL BLADELESS 5MM X 100 W/ADVANCED FIXATION

## (undated) DEVICE — DRAPE PACK, LAVH, W/ATTACHED LEGGINGS, W/POUCH, 100 X 114 IN, LF, STERILE

## (undated) DEVICE — ACCESS SYS, KII SHIELDED BLADED, Z-THREAD, 12X100CM

## (undated) DEVICE — CAUTERY, PENCIL, PUSH BUTTON, SMOKE EVAC, 70MM

## (undated) DEVICE — DRAPE, LEGGINGS, 48 X 31 IN, STERILE, LF

## (undated) DEVICE — PAD, SANITARY, OBSTETRICAL, W/ADHSV STRIP,11 IN,LF

## (undated) DEVICE — SOLUTION, IRRIGATION, SODIUM CHLORIDE 0.9%, 1000 ML, POUR BOTTLE

## (undated) DEVICE — LUBRICANT, JELLY, STERILE, 4OZ, FLIP TOP

## (undated) DEVICE — TROCAR SYSTEM, BALLOON, KII GELPORT, 12 X 100MM

## (undated) DEVICE — SCOPE WARMER, LAPAROSCOPE, BAG ONLY, LF

## (undated) DEVICE — GLOVE, PROTEXIS PI CLASSIC, SZ-7.5, PF, LF

## (undated) DEVICE — APPLICATOR, CHLORAPREP, W/ORANGE TINT, 26ML